# Patient Record
Sex: FEMALE | Race: WHITE | NOT HISPANIC OR LATINO
[De-identification: names, ages, dates, MRNs, and addresses within clinical notes are randomized per-mention and may not be internally consistent; named-entity substitution may affect disease eponyms.]

---

## 2018-08-07 DIAGNOSIS — Z92.89 PERSONAL HISTORY OF OTHER MEDICAL TREATMENT: ICD-10-CM

## 2018-08-10 PROBLEM — Z92.89 H/O MAMMOGRAM: Status: RESOLVED | Noted: 2018-08-10 | Resolved: 2018-08-10

## 2019-02-04 ENCOUNTER — APPOINTMENT (OUTPATIENT)
Dept: OTOLARYNGOLOGY | Facility: CLINIC | Age: 54
End: 2019-02-04
Payer: COMMERCIAL

## 2019-02-04 VITALS
WEIGHT: 158 LBS | DIASTOLIC BLOOD PRESSURE: 99 MMHG | TEMPERATURE: 99.2 F | HEART RATE: 96 BPM | BODY MASS INDEX: 26.33 KG/M2 | HEIGHT: 65 IN | SYSTOLIC BLOOD PRESSURE: 153 MMHG

## 2019-02-04 PROCEDURE — 99204 OFFICE O/P NEW MOD 45 MIN: CPT

## 2019-02-06 NOTE — HISTORY OF PRESENT ILLNESS
[de-identified] : 54yo F incidentally found to have asymptomatic pituitary adenoma in 2009 on work-up for a "throat issue." Patient followed with surveillance imaging by Dr. Vidal ([facility]) at [facility] since 2014. Patient here to discuss second opinion/surgical options. \par \par Patient imaging essentially stable since 2014. Patient denies vision changes, dizziness, nausea, vomiting, galactorrhea, change in ring or shoe size, excessive thirst and urination. Patient saw neuro-ophthalmologist in 2009 (Dr. Zeyad Shaw at [facility]) and was found to have normal afferent and efferent visual function at that time. Last endocrine work-up was in 2010, all wnl. Patient does endorse history of L-sided headaches but easily managed with Excedrin or Tylenol. Patient had originally seen provider at [facility] who recommended radiation therapy, however, radiation physician at [facility] informed her that it would not be the best option. Patient met with Dr. Pacheco to discuss options in 2013 and was recommended radiation tx at that time, however, patient chose to continue with Dr. Vidal. \par

## 2019-02-06 NOTE — PHYSICAL EXAM
[Midline] : trachea located in midline position [Normal] : orientation to person, place, and time: normal [de-identified] : 2+ radial pulses palpable bilaterally\par  [de-identified] : exposed skin of head and neck without rashes/lesions.\par \par

## 2019-02-06 NOTE — ASSESSMENT
[FreeTextEntry1] : 52yo F incidentally found to have asymptomatic pituitary adenoma in 2009. Patient followed with surveillance imaging by Dr. Vidal ([place]) at [facility] since 2014. Patient here to discuss second opinion/surgical options with Dr. West & Dr. Lucero. \par - recommend repeat and continued endocrine and neuro-ophthalmology evaluation (last performed in 2010) \par - was provided with pituitary center booklet by Dr. Lucero's office with provider contact information for endocrine & neuro-ophthalmology\par - imaging from 2014 to date was stable but patient due for repeat scan (was due in Dec 2018); patient would like to continue to get it done at [facility] where she has been doing it for the past 4-5 years\par - discussed at length about continued imaging surveillance and no immediate need to operate since patient remains asymptomatic; importantly, patient's lesion encircles L internal carotid, which will make her operation technically difficult if and/or when she requires surgery\par - Instructed patient to report any new or worsening symptoms\par - Patient expressed verbal understanding of and agreement to above plan\par

## 2019-03-21 ENCOUNTER — FORM ENCOUNTER (OUTPATIENT)
Age: 54
End: 2019-03-21

## 2019-03-22 ENCOUNTER — APPOINTMENT (OUTPATIENT)
Dept: MRI IMAGING | Facility: HOSPITAL | Age: 54
End: 2019-03-22
Payer: COMMERCIAL

## 2019-03-22 ENCOUNTER — OUTPATIENT (OUTPATIENT)
Dept: OUTPATIENT SERVICES | Facility: HOSPITAL | Age: 54
LOS: 1 days | End: 2019-03-22
Payer: COMMERCIAL

## 2019-03-22 PROCEDURE — 70553 MRI BRAIN STEM W/O & W/DYE: CPT | Mod: 26

## 2019-03-22 PROCEDURE — 70553 MRI BRAIN STEM W/O & W/DYE: CPT

## 2019-03-22 PROCEDURE — A9585: CPT

## 2019-04-01 ENCOUNTER — APPOINTMENT (OUTPATIENT)
Dept: OTOLARYNGOLOGY | Facility: CLINIC | Age: 54
End: 2019-04-01
Payer: COMMERCIAL

## 2019-04-01 ENCOUNTER — APPOINTMENT (OUTPATIENT)
Dept: NEUROSURGERY | Facility: CLINIC | Age: 54
End: 2019-04-01
Payer: COMMERCIAL

## 2019-04-01 VITALS
SYSTOLIC BLOOD PRESSURE: 148 MMHG | HEART RATE: 72 BPM | TEMPERATURE: 98.4 F | DIASTOLIC BLOOD PRESSURE: 92 MMHG | OXYGEN SATURATION: 98 %

## 2019-04-01 PROCEDURE — 99214 OFFICE O/P EST MOD 30 MIN: CPT

## 2019-04-01 PROCEDURE — 99213 OFFICE O/P EST LOW 20 MIN: CPT

## 2019-04-01 NOTE — ASSESSMENT
[FreeTextEntry1] : 52yo F incidentally found to have asymptomatic pituitary adenoma in 2009. Patient followed with surveillance imaging by Dr. Vidal ([place]) at [facility] since 2014. Patient here to discuss second opinion/surgical options with Dr. West & Dr. Lucero. \par \par 1. recommend repeat and continued endocrine and neuro-ophthalmology evaluation (last performed in 2010), pt will see next week \par 2. was provided with pituitary center booklet by Dr. Lucero's office with provider contact information for endocrine & neuro-ophthalmology\par 3. 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 discussed at length about continued imaging surveillance and no immediate need to operate since patient remains asymptomatic; importantly, patient's lesion encircles L internal carotid, which will make her operation technically difficult if and/or when she requires surgery\par 5 Instructed patient to report any new or worsening symptoms\par  f/u 1 year after MRI\par

## 2019-04-01 NOTE — PHYSICAL EXAM
[Midline] : trachea located in midline position [Normal] : orientation to person, place, and time: normal [de-identified] : 2+ radial pulses palpable bilaterally\par  [de-identified] : exposed skin of head and neck without rashes/lesions.\par \par

## 2019-04-01 NOTE — HISTORY OF PRESENT ILLNESS
[de-identified] : 52yo F incidentally found to have asymptomatic pituitary adenoma in 2009 on work-up for a "throat issue." Patient followed with surveillance imaging by Dr. Vidal ([facility] [place]) at [facility] since 2014. Patient here to discuss second opinion/surgical options. \par \par Patient imaging essentially stable since 2014. Patient denies vision changes, dizziness, nausea, vomiting, galactorrhea, change in ring or shoe size, excessive thirst and urination. Patient saw neuro-ophthalmologist in 2009 (Dr. Zeyad Shaw at [facility]) and was found to have normal afferent and efferent visual function at that time. Last endocrine work-up was in 2010, all wnl. Patient does endorse history of L-sided headaches but easily managed with Excedrin or Tylenol. Patient had originally seen provider at [facility] who recommended radiation therapy, however, radiation physician at [facility] informed her that it would not be the best option. Patient met with Dr. Pacheco to discuss options in 2013 and was recommended radiation tx at that time, however, patient chose to continue with Dr. Vidal. \par

## 2019-07-03 ENCOUNTER — APPOINTMENT (OUTPATIENT)
Dept: ENDOCRINOLOGY | Facility: CLINIC | Age: 54
End: 2019-07-03
Payer: COMMERCIAL

## 2019-07-03 VITALS
SYSTOLIC BLOOD PRESSURE: 154 MMHG | WEIGHT: 159 LBS | DIASTOLIC BLOOD PRESSURE: 96 MMHG | BODY MASS INDEX: 26.46 KG/M2 | HEART RATE: 89 BPM

## 2019-07-03 PROCEDURE — 99213 OFFICE O/P EST LOW 20 MIN: CPT

## 2019-07-03 NOTE — HISTORY OF PRESENT ILLNESS
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  has some headaches which are not new (and she had them before) but they are not severe and they come and go/ wax and wane.\par sleeps well, 6.5 to 7 hours per night\par cooks own food, eats Mediterranean diet\par no CUshings or acromegaly symptoms.  no galactorrhea.\par \par No meds

## 2019-07-03 NOTE — DATA REVIEWED
[FreeTextEntry1] : 3/10: LH 37.8, FSH 48.4, prolactin 7, TSH 1.78, free T4 1.1, T3 122, cortisol 18, GH 2.3\par 11/09: prolactin 11.6, LH 21, FSH 71, cortisol 21.6, IGF1 187\par \par MRI Brain, 3/19:\par L pituitary macroadenoma with invasion into and throughout L cavernous sinus.  not sig changed since 11/14.  measures 19 x 15 mm.  no suprasellar tumor or mass effect.  chiasm normal signal, slight diagonal course.

## 2019-07-03 NOTE — ASSESSMENT
[FreeTextEntry1] : Pituitary macroadenoma, non functioning.  It is unlikely for adenoma to start secreting (become functioning) at this point.  However, if tumor progresses, pituitary insufficiency is possible (so also need to check for pit hormone deficiencies).   I think it is a good sign that she is able to make high FSH (suggests pituitary is functioning normally). \par RTO 1 year

## 2019-07-03 NOTE — REASON FOR VISIT
[Initial Eval - Existing Diagnosis] : an initial evaluation of an existing diagnosis [FreeTextEntry1] : pituitary adenoma

## 2019-07-03 NOTE — PHYSICAL EXAM
[Healthy Appearance] : healthy appearance [Alert] : alert [Normal Voice/Communication] : normal voice communication [No Proptosis] : no proptosis [Visual Fields Grossly Intact] : visual fields grossly intact [Normal Hearing] : hearing was normal [No Lid Lag] : no lid lag [Thyroid Not Enlarged] : the thyroid was not enlarged [No Thyroid Nodules] : there were no palpable thyroid nodules [Clear to Auscultation] : lungs were clear to auscultation bilaterally [Regular Rhythm] : with a regular rhythm [Normal S1, S2] : normal S1 and S2 [Normal Affect] : the affect was normal [Normal Mood] : the mood was normal [Abdominal Striae] : no abdominal striae [Acanthosis Nigricans] : no acanthosis nigricans [de-identified] : no Cushings or acromegaly features

## 2019-07-08 LAB
ALBUMIN SERPL ELPH-MCNC: 4.8 G/DL
ALP BLD-CCNC: 74 U/L
ALT SERPL-CCNC: 17 U/L
ANION GAP SERPL CALC-SCNC: 14 MMOL/L
AST SERPL-CCNC: 19 U/L
BILIRUB SERPL-MCNC: 1.1 MG/DL
BUN SERPL-MCNC: 12 MG/DL
CALCIUM SERPL-MCNC: 9.4 MG/DL
CHLORIDE SERPL-SCNC: 101 MMOL/L
CO2 SERPL-SCNC: 25 MMOL/L
CORTIS SERPL-MCNC: 10.8 UG/DL
CREAT SERPL-MCNC: 0.53 MG/DL
FSH SERPL-MCNC: 101 IU/L
GLUCOSE SERPL-MCNC: 87 MG/DL
POTASSIUM SERPL-SCNC: 4 MMOL/L
PROLACTIN SERPL-MCNC: 11.9 NG/ML
PROT SERPL-MCNC: 7.4 G/DL
SODIUM SERPL-SCNC: 140 MMOL/L
T4 FREE SERPL-MCNC: 1.2 NG/DL
T4 SERPL-MCNC: 6.8 UG/DL
TSH SERPL-ACNC: 1.29 UIU/ML

## 2019-09-24 NOTE — PHYSICAL EXAM
[Oriented To Time, Place, And Person] : oriented to person, place, and time [FreeTextEntry6] : MAEx4, 5/5

## 2019-09-24 NOTE — PLAN
[FreeTextEntry1] : I have reviewed the patient's clinical history and imaging. The differential diagnosis is skull base lesion, possibly meningioma. The patient's lesion has not changed in size since 2014 and she is asymptomatic from it. She does not need surgical or radiation treatment at this time. This plan was made in association with Dr. West and the patient. The patient should continue to have surveillance imaging every 6-12 months with follow-up with Dr. West and I. She should also see Dr. Umanzor for endocrine work-up.

## 2019-09-24 NOTE — ASSESSMENT
[FreeTextEntry1] : 53 year old female, hx of known pituitary mass originally followed by Dr. Vidal @ [place].  Pituitary mass is stable.  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  Good case for gamma knife if it continues to grow.  Will reimage brain in 9-12 months.

## 2019-09-24 NOTE — HISTORY OF PRESENT ILLNESS
[de-identified] : 53 year old female with an incidentally found asymptomatic pituitary adenoma in 2009 on work-up for throat issues. Followed with surveillance imaging by Dr. Vidal at [facility]. Imaging essentially stable since 2014. Patient does report L-sided headaches managed with Excedrin. Patient had seen provider @ [facility] who recommended radiation therapy, but wad then told it may not be the best option. Patient saw Dr. Pacheco in 2013 to discuss options and was recommended radiation tx at that time, however, patient chose to continue with Dr. Vidal. Now returns for evaluation.\par

## 2019-11-12 ENCOUNTER — OTHER (OUTPATIENT)
Age: 54
End: 2019-11-12

## 2020-01-13 ENCOUNTER — APPOINTMENT (OUTPATIENT)
Dept: OTOLARYNGOLOGY | Facility: CLINIC | Age: 55
End: 2020-01-13

## 2020-01-15 ENCOUNTER — NON-APPOINTMENT (OUTPATIENT)
Age: 55
End: 2020-01-15

## 2020-01-15 ENCOUNTER — APPOINTMENT (OUTPATIENT)
Dept: INTERNAL MEDICINE | Facility: CLINIC | Age: 55
End: 2020-01-15
Payer: COMMERCIAL

## 2020-01-15 VITALS
HEART RATE: 114 BPM | WEIGHT: 158 LBS | OXYGEN SATURATION: 99 % | TEMPERATURE: 98.9 F | SYSTOLIC BLOOD PRESSURE: 130 MMHG | DIASTOLIC BLOOD PRESSURE: 80 MMHG | HEIGHT: 65 IN | BODY MASS INDEX: 26.33 KG/M2

## 2020-01-15 DIAGNOSIS — Z80.0 FAMILY HISTORY OF MALIGNANT NEOPLASM OF DIGESTIVE ORGANS: ICD-10-CM

## 2020-01-15 DIAGNOSIS — Z87.19 PERSONAL HISTORY OF OTHER DISEASES OF THE DIGESTIVE SYSTEM: ICD-10-CM

## 2020-01-15 PROCEDURE — 93000 ELECTROCARDIOGRAM COMPLETE: CPT

## 2020-01-15 PROCEDURE — 99204 OFFICE O/P NEW MOD 45 MIN: CPT | Mod: 25

## 2020-01-15 PROCEDURE — 36415 COLL VENOUS BLD VENIPUNCTURE: CPT

## 2020-01-15 RX ORDER — CEPHALEXIN 250 MG/1
250 CAPSULE ORAL
Qty: 60 | Refills: 0 | Status: COMPLETED | COMMUNITY
Start: 2019-09-14

## 2020-01-15 RX ORDER — SULFAMETHOXAZOLE AND TRIMETHOPRIM 800; 160 MG/1; MG/1
800-160 TABLET ORAL
Qty: 14 | Refills: 0 | Status: COMPLETED | COMMUNITY
Start: 2019-09-16

## 2020-01-15 RX ORDER — BUTOCONAZOLE NITRATE 100 MG/5G
2 CREAM VAGINAL
Qty: 5 | Refills: 0 | Status: COMPLETED | COMMUNITY
Start: 2019-09-20

## 2020-01-15 NOTE — HISTORY OF PRESENT ILLNESS
[FreeTextEntry8] : epigastric pain\par - 4 days ago c/o profuse watery diarrhea\par - following day sx improved however noted mid upper back pain a/w epigastric pain\par - concerned she was having MI\par - sx improved following morning, but now have returned\par - denies any fever or chills, no further episodes of diarrhea\par - h/o gastroenteritis dx on EGD by Dr. Scott 3 yrs ago\par - previously on PPI but stopped once sx improved.

## 2020-01-15 NOTE — HEALTH RISK ASSESSMENT
[] : No [No] : No [No falls in past year] : Patient reported no falls in the past year [0] : 2) Feeling down, depressed, or hopeless: Not at all (0) [[id number]] : 0

## 2020-01-16 ENCOUNTER — TRANSCRIPTION ENCOUNTER (OUTPATIENT)
Age: 55
End: 2020-01-16

## 2020-01-16 LAB
ALBUMIN SERPL ELPH-MCNC: 4.6 G/DL
ALP BLD-CCNC: 71 U/L
ALT SERPL-CCNC: 24 U/L
AMYLASE/CREAT SERPL: 41 U/L
ANION GAP SERPL CALC-SCNC: 14 MMOL/L
AST SERPL-CCNC: 20 U/L
BASOPHILS # BLD AUTO: 0.04 K/UL
BASOPHILS NFR BLD AUTO: 0.5 %
BILIRUB SERPL-MCNC: 1.2 MG/DL
BUN SERPL-MCNC: 10 MG/DL
CALCIUM SERPL-MCNC: 9.5 MG/DL
CHLORIDE SERPL-SCNC: 103 MMOL/L
CO2 SERPL-SCNC: 24 MMOL/L
CREAT SERPL-MCNC: 0.57 MG/DL
EOSINOPHIL # BLD AUTO: 0.02 K/UL
EOSINOPHIL NFR BLD AUTO: 0.2 %
GLUCOSE SERPL-MCNC: 104 MG/DL
HCT VFR BLD CALC: 42.8 %
HGB BLD-MCNC: 13.7 G/DL
IMM GRANULOCYTES NFR BLD AUTO: 0.4 %
LPL SERPL-CCNC: 25 U/L
LYMPHOCYTES # BLD AUTO: 1.3 K/UL
LYMPHOCYTES NFR BLD AUTO: 15.7 %
MAN DIFF?: NORMAL
MCHC RBC-ENTMCNC: 30.2 PG
MCHC RBC-ENTMCNC: 32 GM/DL
MCV RBC AUTO: 94.3 FL
MONOCYTES # BLD AUTO: 0.58 K/UL
MONOCYTES NFR BLD AUTO: 7 %
NEUTROPHILS # BLD AUTO: 6.29 K/UL
NEUTROPHILS NFR BLD AUTO: 76.2 %
PLATELET # BLD AUTO: 337 K/UL
POTASSIUM SERPL-SCNC: 3.9 MMOL/L
PROT SERPL-MCNC: 7.2 G/DL
RBC # BLD: 4.54 M/UL
RBC # FLD: 12.4 %
SODIUM SERPL-SCNC: 140 MMOL/L
WBC # FLD AUTO: 8.26 K/UL

## 2020-02-07 ENCOUNTER — APPOINTMENT (OUTPATIENT)
Dept: MRI IMAGING | Facility: HOSPITAL | Age: 55
End: 2020-02-07

## 2020-02-07 ENCOUNTER — APPOINTMENT (OUTPATIENT)
Dept: NEUROSURGERY | Facility: CLINIC | Age: 55
End: 2020-02-07
Payer: COMMERCIAL

## 2020-02-07 ENCOUNTER — OUTPATIENT (OUTPATIENT)
Dept: OUTPATIENT SERVICES | Facility: HOSPITAL | Age: 55
LOS: 1 days | End: 2020-02-07
Payer: COMMERCIAL

## 2020-02-07 VITALS
HEART RATE: 103 BPM | DIASTOLIC BLOOD PRESSURE: 96 MMHG | HEIGHT: 65 IN | BODY MASS INDEX: 26.33 KG/M2 | TEMPERATURE: 98.7 F | WEIGHT: 158 LBS | OXYGEN SATURATION: 98 % | RESPIRATION RATE: 18 BRPM | SYSTOLIC BLOOD PRESSURE: 146 MMHG

## 2020-02-07 PROCEDURE — 70553 MRI BRAIN STEM W/O & W/DYE: CPT | Mod: 26

## 2020-02-07 PROCEDURE — 99214 OFFICE O/P EST MOD 30 MIN: CPT

## 2020-02-07 PROCEDURE — A9585: CPT

## 2020-02-07 PROCEDURE — 70553 MRI BRAIN STEM W/O & W/DYE: CPT

## 2020-02-07 RX ORDER — ESOMEPRAZOLE MAGNESIUM 40 MG/1
40 CAPSULE, DELAYED RELEASE ORAL DAILY
Qty: 30 | Refills: 0 | Status: DISCONTINUED | COMMUNITY
Start: 2020-01-15 | End: 2020-02-07

## 2020-02-07 NOTE — HISTORY OF PRESENT ILLNESS
[> 3 months] : more  than 3 months [de-identified] : 53 year old female with an incidentally found asymptomatic pituitary adenoma in 2009 on work-up for throat issues. Followed with surveillance imaging by Dr. Vidal at [facility]. Imaging essentially stable since 2014. Patient does report L-sided headaches managed with Excedrin. Patient had seen provider @ [facility] who recommended radiation therapy, but wad then told it may not be the best option. Patient saw Dr. Pacheco in 2013 to discuss options and was recommended radiation tx at that time, however, patient chose to continue with Dr. Vidal.\par \par Returns today to review a new MRI. \par

## 2020-02-07 NOTE — PHYSICAL EXAM
[General Appearance - Alert] : alert [General Appearance - In No Acute Distress] : in no acute distress [Cranial Nerves Optic (II)] : visual acuity intact bilaterally,  pupils equal round and reactive to light [Cranial Nerves Oculomotor (III)] : extraocular motion intact [Cranial Nerves Trigeminal (V)] : facial sensation intact symmetrically [Cranial Nerves Facial (VII)] : face symmetrical [Cranial Nerves Vestibulocochlear (VIII)] : hearing was intact bilaterally [Cranial Nerves Glossopharyngeal (IX)] : tongue and palate midline [Cranial Nerves Hypoglossal (XII)] : there was no tongue deviation with protrusion [Motor Tone] : muscle tone was normal in all four extremities [Cranial Nerves Accessory (XI - Cranial And Spinal)] : head turning and shoulder shrug symmetric [Motor Handedness Right-Handed] : the patient is right hand dominant [PERRL With Normal Accommodation] : pupils were equal in size, round, reactive to light, with normal accommodation [Motor Strength] : muscle strength was normal in all four extremities [Full Visual Field] : full visual field [Extraocular Movements] : extraocular movements were intact [] : no respiratory distress [Neck Appearance] : the appearance of the neck was normal [Abnormal Walk] : normal gait

## 2020-02-07 NOTE — ASSESSMENT
[FreeTextEntry1] : My impression is that the patient suffers from a nonsecreting pituitary adenoma and a right petroclival meningioma. Her pituitary mass is stable and her meningioma has some very slight growth when compared to 2018. I had a long discussion with the patient regarding the role of continued surveillance with annual MRI.  The patient was extensively educated about the nature of her disease process. Therapeutic and diagnostic tests include MRI brain with and without contrast February 2021.  I will see the patient back at that time. I have explained the alternatives, risks and benefits to the patient and she understands and agrees to proceed. We discussed SRS. She would like to continue to monitor without treatment at this time. This risk of the procedure including but not limited to pain, infection, seizure, stroke, recurrence, residual disease, neurovascular injury, heart attack, pulmonary embolism, blindness, weakness, paralysis and death have been carefully explained to the patient who clearly understands and agrees to proceed.\par .

## 2020-02-12 ENCOUNTER — APPOINTMENT (OUTPATIENT)
Dept: TRANSPLANT | Facility: CLINIC | Age: 55
End: 2020-02-12
Payer: COMMERCIAL

## 2020-02-12 VITALS
WEIGHT: 158 LBS | RESPIRATION RATE: 15 BRPM | BODY MASS INDEX: 26.33 KG/M2 | DIASTOLIC BLOOD PRESSURE: 91 MMHG | HEIGHT: 65 IN | OXYGEN SATURATION: 98 % | SYSTOLIC BLOOD PRESSURE: 138 MMHG | HEART RATE: 100 BPM | TEMPERATURE: 97.9 F

## 2020-02-12 PROCEDURE — 99215 OFFICE O/P EST HI 40 MIN: CPT

## 2020-02-14 ENCOUNTER — APPOINTMENT (OUTPATIENT)
Dept: INTERNAL MEDICINE | Facility: CLINIC | Age: 55
End: 2020-02-14
Payer: COMMERCIAL

## 2020-02-14 PROCEDURE — 83014 H PYLORI DRUG ADMIN: CPT

## 2020-02-18 LAB — UREA BREATH TEST QL: POSITIVE

## 2020-03-04 RX ORDER — AMOXICILLIN 500 MG/1
500 TABLET, FILM COATED ORAL TWICE DAILY
Qty: 56 | Refills: 0 | Status: COMPLETED | COMMUNITY
Start: 2020-02-18 | End: 2020-03-03

## 2020-03-04 RX ORDER — OMEPRAZOLE 20 MG/1
20 CAPSULE, DELAYED RELEASE ORAL TWICE DAILY
Qty: 28 | Refills: 0 | Status: COMPLETED | COMMUNITY
Start: 2020-02-18 | End: 2020-03-03

## 2020-03-04 RX ORDER — CLARITHROMYCIN 500 MG/1
500 TABLET, FILM COATED ORAL
Qty: 28 | Refills: 0 | Status: COMPLETED | COMMUNITY
Start: 2020-02-18 | End: 2020-03-03

## 2020-06-01 ENCOUNTER — APPOINTMENT (OUTPATIENT)
Dept: MRI IMAGING | Facility: CLINIC | Age: 55
End: 2020-06-01

## 2021-06-08 ENCOUNTER — NON-APPOINTMENT (OUTPATIENT)
Age: 56
End: 2021-06-08

## 2021-07-08 ENCOUNTER — LABORATORY RESULT (OUTPATIENT)
Age: 56
End: 2021-07-08

## 2021-07-08 ENCOUNTER — APPOINTMENT (OUTPATIENT)
Dept: INTERNAL MEDICINE | Facility: CLINIC | Age: 56
End: 2021-07-08
Payer: COMMERCIAL

## 2021-07-08 VITALS
DIASTOLIC BLOOD PRESSURE: 100 MMHG | WEIGHT: 140 LBS | SYSTOLIC BLOOD PRESSURE: 146 MMHG | HEART RATE: 93 BPM | HEIGHT: 65 IN | OXYGEN SATURATION: 100 % | BODY MASS INDEX: 23.32 KG/M2 | TEMPERATURE: 98.3 F

## 2021-07-08 DIAGNOSIS — R10.13 EPIGASTRIC PAIN: ICD-10-CM

## 2021-07-08 DIAGNOSIS — Z87.19 PERSONAL HISTORY OF OTHER DISEASES OF THE DIGESTIVE SYSTEM: ICD-10-CM

## 2021-07-08 PROCEDURE — 99396 PREV VISIT EST AGE 40-64: CPT | Mod: 25

## 2021-07-08 PROCEDURE — 83014 H PYLORI DRUG ADMIN: CPT

## 2021-07-08 PROCEDURE — 36415 COLL VENOUS BLD VENIPUNCTURE: CPT

## 2021-07-08 PROCEDURE — 99072 ADDL SUPL MATRL&STAF TM PHE: CPT

## 2021-07-08 RX ORDER — SUCRALFATE 1 G/1
1 TABLET ORAL 4 TIMES DAILY
Qty: 360 | Refills: 0 | Status: COMPLETED | COMMUNITY
Start: 2020-02-13 | End: 2021-07-08

## 2021-07-08 NOTE — PHYSICAL EXAM
[Memory Grossly Normal] : memory grossly normal [Normal] : normal gait, coordination grossly intact, no focal deficits and deep tendon reflexes were 2+ and symmetric

## 2021-07-08 NOTE — HISTORY OF PRESENT ILLNESS
[FreeTextEntry1] : Annual visit  [de-identified] : Mrs. Awad is a 55 Y/F\par - overall doing well\par \par Breathtek Positive\par - completed treatment last year\par - has not had repeat testing\par \par HCM \par Colonoscopy - needed; due 2021\par GYN- Completed would like new referral. Dr. Mcginnis dis not accept insurance

## 2021-07-08 NOTE — HEALTH RISK ASSESSMENT
[0] : 2) Feeling down, depressed, or hopeless: Not at all (0) [Patient reported mammogram was normal] : Patient reported mammogram was normal [FreeTextEntry1] : Overall health [[id number]] : 0 [MammogramDate] : 06/15/2021 [PapSmearDate] : 01/01/2021 [ColonoscopyDate] : 01/01/2016 [AdvancecareDate] : 07/08/2021

## 2021-07-13 ENCOUNTER — NON-APPOINTMENT (OUTPATIENT)
Age: 56
End: 2021-07-13

## 2021-07-14 ENCOUNTER — NON-APPOINTMENT (OUTPATIENT)
Age: 56
End: 2021-07-14

## 2021-07-14 LAB
25(OH)D3 SERPL-MCNC: 21.5 NG/ML
ALBUMIN SERPL ELPH-MCNC: 4.8 G/DL
ALP BLD-CCNC: 80 U/L
ALT SERPL-CCNC: 17 U/L
ANION GAP SERPL CALC-SCNC: 14 MMOL/L
APPEARANCE: ABNORMAL
AST SERPL-CCNC: 16 U/L
BASOPHILS # BLD AUTO: 0.06 K/UL
BASOPHILS NFR BLD AUTO: 1 %
BILIRUB SERPL-MCNC: 1.3 MG/DL
BILIRUBIN URINE: NEGATIVE
BLOOD URINE: NEGATIVE
BUN SERPL-MCNC: 12 MG/DL
CALCIUM SERPL-MCNC: 9.7 MG/DL
CHLORIDE SERPL-SCNC: 102 MMOL/L
CHOLEST SERPL-MCNC: 299 MG/DL
CO2 SERPL-SCNC: 26 MMOL/L
COLOR: YELLOW
CREAT SERPL-MCNC: 0.59 MG/DL
EOSINOPHIL # BLD AUTO: 0.07 K/UL
EOSINOPHIL NFR BLD AUTO: 1.2 %
ESTIMATED AVERAGE GLUCOSE: 103 MG/DL
GLUCOSE QUALITATIVE U: NEGATIVE
GLUCOSE SERPL-MCNC: 106 MG/DL
HBA1C MFR BLD HPLC: 5.2 %
HCT VFR BLD CALC: 45.8 %
HDLC SERPL-MCNC: 81 MG/DL
HGB BLD-MCNC: 14.4 G/DL
IMM GRANULOCYTES NFR BLD AUTO: 0.3 %
KETONES URINE: NORMAL
LDLC SERPL CALC-MCNC: 204 MG/DL
LEUKOCYTE ESTERASE URINE: ABNORMAL
LYMPHOCYTES # BLD AUTO: 1.61 K/UL
LYMPHOCYTES NFR BLD AUTO: 27.6 %
MAN DIFF?: NORMAL
MCHC RBC-ENTMCNC: 30.4 PG
MCHC RBC-ENTMCNC: 31.4 GM/DL
MCV RBC AUTO: 96.8 FL
MONOCYTES # BLD AUTO: 0.36 K/UL
MONOCYTES NFR BLD AUTO: 6.2 %
NEUTROPHILS # BLD AUTO: 3.72 K/UL
NEUTROPHILS NFR BLD AUTO: 63.7 %
NITRITE URINE: POSITIVE
NONHDLC SERPL-MCNC: 218 MG/DL
PH URINE: 6.5
PLATELET # BLD AUTO: 330 K/UL
POTASSIUM SERPL-SCNC: 3.9 MMOL/L
PROT SERPL-MCNC: 7.5 G/DL
PROTEIN URINE: NEGATIVE
RBC # BLD: 4.73 M/UL
RBC # FLD: 12.7 %
SODIUM SERPL-SCNC: 143 MMOL/L
SPECIFIC GRAVITY URINE: 1.02
TRIGL SERPL-MCNC: 70 MG/DL
TSH SERPL-ACNC: 1.54 UIU/ML
UREA BREATH TEST QL: NEGATIVE
UROBILINOGEN URINE: NORMAL
WBC # FLD AUTO: 5.84 K/UL

## 2021-07-30 ENCOUNTER — APPOINTMENT (OUTPATIENT)
Dept: HEART AND VASCULAR | Facility: CLINIC | Age: 56
End: 2021-07-30
Payer: COMMERCIAL

## 2021-07-30 ENCOUNTER — NON-APPOINTMENT (OUTPATIENT)
Age: 56
End: 2021-07-30

## 2021-07-30 VITALS
DIASTOLIC BLOOD PRESSURE: 94 MMHG | WEIGHT: 141 LBS | BODY MASS INDEX: 23.49 KG/M2 | HEIGHT: 65 IN | HEART RATE: 105 BPM | SYSTOLIC BLOOD PRESSURE: 160 MMHG | TEMPERATURE: 97.6 F

## 2021-07-30 PROCEDURE — 93000 ELECTROCARDIOGRAM COMPLETE: CPT

## 2021-07-30 PROCEDURE — 99204 OFFICE O/P NEW MOD 45 MIN: CPT

## 2021-08-01 NOTE — PHYSICAL EXAM
[Well Developed] : well developed [Well Nourished] : well nourished [No Acute Distress] : no acute distress [Normal Conjunctiva] : normal conjunctiva [Normal Venous Pressure] : normal venous pressure [No Carotid Bruit] : no carotid bruit [Normal S1, S2] : normal S1, S2 [No Murmur] : no murmur [No Rub] : no rub [No Gallop] : no gallop [Clear Lung Fields] : clear lung fields [Good Air Entry] : good air entry [No Respiratory Distress] : no respiratory distress  [Soft] : abdomen soft [Non Tender] : non-tender [No Masses/organomegaly] : no masses/organomegaly [Normal Bowel Sounds] : normal bowel sounds [Normal Gait] : normal gait [No Edema] : no edema [No Cyanosis] : no cyanosis [No Clubbing] : no clubbing [No Varicosities] : no varicosities [No Rash] : no rash [No Skin Lesions] : no skin lesions [Moves all extremities] : moves all extremities [No Focal Deficits] : no focal deficits [Normal Speech] : normal speech [Alert and Oriented] : alert and oriented [Normal memory] : normal memory [de-identified] : No xanthomas, xanthalasma, corneal arucs

## 2021-08-01 NOTE — HISTORY OF PRESENT ILLNESS
[FreeTextEntry1] : 56F here for evaluation of hyperlipidemia.\par \par Has had HLD since at least 2006. Had discussed statin at that time, but wanted to avoided medications so never started. \par Her home SBPs are <125. Usually elevated in the office due to anxiety.\par Reports she is an anxious person and has a lot of stress with work. \par \par Denies chest pain on exertion or rest, dyspnea, orthopnea, PND, pre syncope, syncope. \par \par PMHx:\par Pituitary macroadenoma (non secretory)\par Meningioma \par HLD\par \par Fam Hx: No Premature CAD\par \par Social hx:\par never smoked, rarely drinks alcohol\par 1 hour of exercise/week\par Diet score 8\par Menopause 48. No pregnancy complications\par \par Data:\par Lipids: T chol 299, HDL 81, , N , Trig 70\par

## 2021-08-01 NOTE — DISCUSSION/SUMMARY
[FreeTextEntry1] : 56F with longstanding HLD not on therapy.\par \par #HLD: Lifestyle is fairly optimal but lipids are abnormal.\par -Extensive discussion regarding starting statin therapy to aim 50% reduction at least in LDL.\par Start rosuvastatin 20mg\par -Continue with lifestyle, increase activity to 150 min/week\par -Obtain lipo(a)\par -CT calcium score to further risk stratify\par \par #White coat HTN: Monitor. BPs 120s at home. We will evaluate at a subsequent visit once established on lipid therapy\par \par Follow up 2 months\par \par

## 2021-08-01 NOTE — END OF VISIT
[] : Fellow [FreeTextEntry3] : Patient seen and examined. Case discussed with preventive cardiology fellow. Agree with plan as detailed above.

## 2021-08-02 LAB — APO LP(A) SERPL-MCNC: 43.1 NMOL/L

## 2021-09-28 ENCOUNTER — TRANSCRIPTION ENCOUNTER (OUTPATIENT)
Age: 56
End: 2021-09-28

## 2021-10-01 ENCOUNTER — NON-APPOINTMENT (OUTPATIENT)
Age: 56
End: 2021-10-01

## 2021-11-11 ENCOUNTER — APPOINTMENT (OUTPATIENT)
Dept: CT IMAGING | Facility: HOSPITAL | Age: 56
End: 2021-11-11

## 2022-01-11 ENCOUNTER — TRANSCRIPTION ENCOUNTER (OUTPATIENT)
Age: 57
End: 2022-01-11

## 2022-01-11 LAB
CHOLEST SERPL-MCNC: 260 MG/DL
HDLC SERPL-MCNC: 79 MG/DL
LDLC SERPL CALC-MCNC: 162 MG/DL
NONHDLC SERPL-MCNC: 180 MG/DL
TRIGL SERPL-MCNC: 94 MG/DL

## 2022-10-14 ENCOUNTER — APPOINTMENT (OUTPATIENT)
Dept: INTERNAL MEDICINE | Facility: CLINIC | Age: 57
End: 2022-10-14

## 2022-10-14 VITALS
OXYGEN SATURATION: 99 % | TEMPERATURE: 98.6 F | WEIGHT: 144 LBS | RESPIRATION RATE: 16 BRPM | BODY MASS INDEX: 23.99 KG/M2 | DIASTOLIC BLOOD PRESSURE: 93 MMHG | SYSTOLIC BLOOD PRESSURE: 142 MMHG | HEART RATE: 78 BPM | HEIGHT: 65 IN

## 2022-10-14 DIAGNOSIS — M25.561 PAIN IN RIGHT KNEE: ICD-10-CM

## 2022-10-14 DIAGNOSIS — A04.8 OTHER SPECIFIED BACTERIAL INTESTINAL INFECTIONS: ICD-10-CM

## 2022-10-14 PROCEDURE — 99396 PREV VISIT EST AGE 40-64: CPT | Mod: 25

## 2022-10-14 PROCEDURE — 36415 COLL VENOUS BLD VENIPUNCTURE: CPT

## 2022-10-14 NOTE — PHYSICAL EXAM
[No Acute Distress] : no acute distress [Well Nourished] : well nourished [Well Developed] : well developed [Well-Appearing] : well-appearing [Normal Sclera/Conjunctiva] : normal sclera/conjunctiva [EOMI] : extraocular movements intact [Normal Outer Ear/Nose] : the outer ears and nose were normal in appearance [No Respiratory Distress] : no respiratory distress  [No Accessory Muscle Use] : no accessory muscle use [Clear to Auscultation] : lungs were clear to auscultation bilaterally [Normal Rate] : normal rate  [Regular Rhythm] : with a regular rhythm [Normal S1, S2] : normal S1 and S2 [No Murmur] : no murmur heard [No Edema] : there was no peripheral edema [No Extremity Clubbing/Cyanosis] : no extremity clubbing/cyanosis [Soft] : abdomen soft [Non Tender] : non-tender [Non-distended] : non-distended [No Masses] : no abdominal mass palpated [No HSM] : no HSM [No CVA Tenderness] : no CVA  tenderness [No Spinal Tenderness] : no spinal tenderness [Grossly Normal Strength/Tone] : grossly normal strength/tone [No Rash] : no rash [Coordination Grossly Intact] : coordination grossly intact [No Focal Deficits] : no focal deficits [Normal Gait] : normal gait [Normal Affect] : the affect was normal [Alert and Oriented x3] : oriented to person, place, and time [Normal Mood] : the mood was normal [Normal Insight/Judgement] : insight and judgment were intact [de-identified] : right medial joint line tenderness

## 2022-10-14 NOTE — HISTORY OF PRESENT ILLNESS
[FreeTextEntry1] : annual [de-identified] : right knee pain w/ swelling\par - present for few months\par - evaluated by ortho at [facility]\par - completed PT\par - MRI showed b/l meniscal tears\par - was recommended to have cortisone injection, but deferred\par \par HCM\par - mammo completed 8/11/22\par - c scope done 2016, was due 2019

## 2022-10-21 ENCOUNTER — TRANSCRIPTION ENCOUNTER (OUTPATIENT)
Age: 57
End: 2022-10-21

## 2022-10-21 LAB
ALBUMIN SERPL ELPH-MCNC: 4.6 G/DL
ALP BLD-CCNC: 72 U/L
ALT SERPL-CCNC: 27 U/L
ANION GAP SERPL CALC-SCNC: 14 MMOL/L
APPEARANCE: CLEAR
AST SERPL-CCNC: 20 U/L
BASOPHILS # BLD AUTO: 0.05 K/UL
BASOPHILS NFR BLD AUTO: 0.9 %
BILIRUB SERPL-MCNC: 1.8 MG/DL
BILIRUBIN URINE: NEGATIVE
BLOOD URINE: NEGATIVE
BUN SERPL-MCNC: 9 MG/DL
CALCIUM SERPL-MCNC: 9.6 MG/DL
CHLORIDE SERPL-SCNC: 102 MMOL/L
CHOLEST SERPL-MCNC: 160 MG/DL
CO2 SERPL-SCNC: 25 MMOL/L
COLOR: YELLOW
CREAT SERPL-MCNC: 0.48 MG/DL
EGFR: 109 ML/MIN/1.73M2
EOSINOPHIL # BLD AUTO: 0.04 K/UL
EOSINOPHIL NFR BLD AUTO: 0.7 %
ESTIMATED AVERAGE GLUCOSE: 111 MG/DL
GLUCOSE QUALITATIVE U: NEGATIVE
GLUCOSE SERPL-MCNC: 94 MG/DL
HBA1C MFR BLD HPLC: 5.5 %
HCT VFR BLD CALC: 44.6 %
HDLC SERPL-MCNC: 78 MG/DL
HGB BLD-MCNC: 14.4 G/DL
IMM GRANULOCYTES NFR BLD AUTO: 0.2 %
KETONES URINE: NORMAL
LDLC SERPL CALC-MCNC: 70 MG/DL
LEUKOCYTE ESTERASE URINE: NEGATIVE
LYMPHOCYTES # BLD AUTO: 1.35 K/UL
LYMPHOCYTES NFR BLD AUTO: 23.6 %
MAN DIFF?: NORMAL
MCHC RBC-ENTMCNC: 30.1 PG
MCHC RBC-ENTMCNC: 32.3 GM/DL
MCV RBC AUTO: 93.1 FL
MONOCYTES # BLD AUTO: 0.33 K/UL
MONOCYTES NFR BLD AUTO: 5.8 %
NEUTROPHILS # BLD AUTO: 3.95 K/UL
NEUTROPHILS NFR BLD AUTO: 68.8 %
NITRITE URINE: NEGATIVE
NONHDLC SERPL-MCNC: 82 MG/DL
PH URINE: 6
PLATELET # BLD AUTO: 309 K/UL
POTASSIUM SERPL-SCNC: 3.9 MMOL/L
PROT SERPL-MCNC: 7.4 G/DL
PROTEIN URINE: NORMAL
RBC # BLD: 4.79 M/UL
RBC # FLD: 12.6 %
SODIUM SERPL-SCNC: 141 MMOL/L
SPECIFIC GRAVITY URINE: 1.02
TRIGL SERPL-MCNC: 60 MG/DL
TSH SERPL-ACNC: 0.65 UIU/ML
UROBILINOGEN URINE: NORMAL
WBC # FLD AUTO: 5.73 K/UL

## 2022-11-15 ENCOUNTER — TRANSCRIPTION ENCOUNTER (OUTPATIENT)
Age: 57
End: 2022-11-15

## 2022-11-15 DIAGNOSIS — R17 UNSPECIFIED JAUNDICE: ICD-10-CM

## 2022-11-22 DIAGNOSIS — D35.2 BENIGN NEOPLASM OF PITUITARY GLAND: ICD-10-CM

## 2022-11-22 DIAGNOSIS — K76.89 OTHER SPECIFIED DISEASES OF LIVER: ICD-10-CM

## 2022-12-03 ENCOUNTER — APPOINTMENT (OUTPATIENT)
Dept: MRI IMAGING | Facility: CLINIC | Age: 57
End: 2022-12-03

## 2022-12-03 ENCOUNTER — OUTPATIENT (OUTPATIENT)
Dept: OUTPATIENT SERVICES | Facility: HOSPITAL | Age: 57
LOS: 1 days | End: 2022-12-03

## 2022-12-03 ENCOUNTER — RESULT REVIEW (OUTPATIENT)
Age: 57
End: 2022-12-03

## 2022-12-03 PROCEDURE — 74183 MRI ABD W/O CNTR FLWD CNTR: CPT | Mod: 26

## 2022-12-13 LAB
ALBUMIN SERPL ELPH-MCNC: 4.9 G/DL
ALP BLD-CCNC: 76 U/L
ALT SERPL-CCNC: 31 U/L
ANION GAP SERPL CALC-SCNC: 13 MMOL/L
AST SERPL-CCNC: 22 U/L
BASOPHILS # BLD AUTO: 0.08 K/UL
BASOPHILS NFR BLD AUTO: 1.4 %
BILIRUB SERPL-MCNC: 1.2 MG/DL
BUN SERPL-MCNC: 12 MG/DL
CALCIUM SERPL-MCNC: 10.4 MG/DL
CHLORIDE SERPL-SCNC: 103 MMOL/L
CO2 SERPL-SCNC: 28 MMOL/L
CREAT SERPL-MCNC: 0.56 MG/DL
EGFR: 105 ML/MIN/1.73M2
EOSINOPHIL # BLD AUTO: 0.07 K/UL
EOSINOPHIL NFR BLD AUTO: 1.2 %
GLUCOSE SERPL-MCNC: 77 MG/DL
HCT VFR BLD CALC: 42.1 %
HGB BLD-MCNC: 14.1 G/DL
IMM GRANULOCYTES NFR BLD AUTO: 0.2 %
INR PPP: 0.99 RATIO
LYMPHOCYTES # BLD AUTO: 2.09 K/UL
LYMPHOCYTES NFR BLD AUTO: 35.7 %
MAN DIFF?: NORMAL
MCHC RBC-ENTMCNC: 30.5 PG
MCHC RBC-ENTMCNC: 33.5 GM/DL
MCV RBC AUTO: 90.9 FL
MONOCYTES # BLD AUTO: 0.45 K/UL
MONOCYTES NFR BLD AUTO: 7.7 %
NEUTROPHILS # BLD AUTO: 3.16 K/UL
NEUTROPHILS NFR BLD AUTO: 53.8 %
PLATELET # BLD AUTO: 307 K/UL
POTASSIUM SERPL-SCNC: 3.8 MMOL/L
PROT SERPL-MCNC: 7.2 G/DL
PT BLD: 11.6 SEC
RBC # BLD: 4.63 M/UL
RBC # FLD: 12 %
SODIUM SERPL-SCNC: 143 MMOL/L
WBC # FLD AUTO: 5.86 K/UL

## 2023-06-05 ENCOUNTER — NON-APPOINTMENT (OUTPATIENT)
Age: 58
End: 2023-06-05

## 2023-06-16 ENCOUNTER — APPOINTMENT (OUTPATIENT)
Dept: OBGYN | Facility: CLINIC | Age: 58
End: 2023-06-16

## 2023-06-16 ENCOUNTER — APPOINTMENT (OUTPATIENT)
Dept: OBGYN | Facility: CLINIC | Age: 58
End: 2023-06-16
Payer: COMMERCIAL

## 2023-06-16 VITALS
BODY MASS INDEX: 25.16 KG/M2 | WEIGHT: 151 LBS | HEIGHT: 65 IN | HEART RATE: 78 BPM | OXYGEN SATURATION: 99 % | DIASTOLIC BLOOD PRESSURE: 110 MMHG | SYSTOLIC BLOOD PRESSURE: 169 MMHG

## 2023-06-16 DIAGNOSIS — Z01.419 ENCOUNTER FOR GYNECOLOGICAL EXAMINATION (GENERAL) (ROUTINE) W/OUT ABNORMAL FINDINGS: ICD-10-CM

## 2023-06-16 DIAGNOSIS — N95.1 MENOPAUSAL AND FEMALE CLIMACTERIC STATES: ICD-10-CM

## 2023-06-16 PROCEDURE — 99386 PREV VISIT NEW AGE 40-64: CPT

## 2023-06-16 RX ORDER — ESTRADIOL 4 UG/1
4 INSERT VAGINAL
Qty: 24 | Refills: 3 | Status: ACTIVE | COMMUNITY
Start: 2023-06-16 | End: 1900-01-01

## 2023-06-16 RX ORDER — ESTRADIOL 4 UG/1
4 INSERT VAGINAL
Qty: 1 | Refills: 0 | Status: ACTIVE | COMMUNITY
Start: 2023-06-16 | End: 1900-01-01

## 2023-06-16 NOTE — PLAN
[FreeTextEntry1] : annual; pap and hpv\par \par has mammogram next week with private radiologist so doesn't need referral\par \par has vaginal dryness/ irritationvulva, had been on imvexxy: will restart\par also may try lubricants and possibly vaseline\par \par will do another colonoscopy\par reminded to do skin checks\par \par has off and on uti sx so cx done, was recently given keflex from urgent care

## 2023-06-16 NOTE — HISTORY OF PRESENT ILLNESS
[LMP unknown] : LMP unknown [Y] : Patient is sexually active [N] : Patient denies prior pregnancies [Patient reported mammogram was normal] : Patient reported mammogram was normal [Patient reported colonoscopy was normal] : Patient reported colonoscopy was normal [FreeTextEntry1] : Patient presents for initial office visit.  [Mammogramdate] : 2022

## 2023-06-16 NOTE — REVIEW OF SYSTEMS
[Negative] : Cardiovascular [FreeTextEntry2] : high cholesterol [de-identified] : herminia [de-identified] : nonsecreting pituitary adenoma

## 2023-06-17 LAB — HPV HIGH+LOW RISK DNA PNL CVX: NOT DETECTED

## 2023-06-21 LAB
BACTERIA UR CULT: ABNORMAL
CYTOLOGY CVX/VAG DOC THIN PREP: NORMAL

## 2023-12-12 ENCOUNTER — TRANSCRIPTION ENCOUNTER (OUTPATIENT)
Age: 58
End: 2023-12-12

## 2023-12-21 RX ORDER — ROSUVASTATIN CALCIUM 20 MG/1
20 TABLET, FILM COATED ORAL DAILY
Qty: 90 | Refills: 0 | Status: ACTIVE | COMMUNITY
Start: 2021-07-30 | End: 1900-01-01

## 2024-02-14 ENCOUNTER — TRANSCRIPTION ENCOUNTER (OUTPATIENT)
Age: 59
End: 2024-02-14

## 2024-02-15 ENCOUNTER — TRANSCRIPTION ENCOUNTER (OUTPATIENT)
Age: 59
End: 2024-02-15

## 2024-02-16 ENCOUNTER — TRANSCRIPTION ENCOUNTER (OUTPATIENT)
Age: 59
End: 2024-02-16

## 2024-02-26 ENCOUNTER — APPOINTMENT (OUTPATIENT)
Dept: INTERNAL MEDICINE | Facility: CLINIC | Age: 59
End: 2024-02-26
Payer: COMMERCIAL

## 2024-02-26 VITALS
TEMPERATURE: 98.7 F | BODY MASS INDEX: 24.99 KG/M2 | SYSTOLIC BLOOD PRESSURE: 153 MMHG | HEART RATE: 89 BPM | OXYGEN SATURATION: 99 % | WEIGHT: 150 LBS | DIASTOLIC BLOOD PRESSURE: 92 MMHG | RESPIRATION RATE: 16 BRPM | HEIGHT: 65 IN

## 2024-02-26 VITALS — DIASTOLIC BLOOD PRESSURE: 104 MMHG | SYSTOLIC BLOOD PRESSURE: 152 MMHG

## 2024-02-26 VITALS — SYSTOLIC BLOOD PRESSURE: 137 MMHG | DIASTOLIC BLOOD PRESSURE: 72 MMHG

## 2024-02-26 DIAGNOSIS — K21.9 GASTRO-ESOPHAGEAL REFLUX DISEASE W/OUT ESOPHAGITIS: ICD-10-CM

## 2024-02-26 PROCEDURE — G2211 COMPLEX E/M VISIT ADD ON: CPT

## 2024-02-26 PROCEDURE — 99214 OFFICE O/P EST MOD 30 MIN: CPT | Mod: GC

## 2024-02-26 RX ORDER — NITROFURANTOIN MACROCRYSTALS 100 MG/1
100 CAPSULE ORAL
Qty: 10 | Refills: 3 | Status: DISCONTINUED | COMMUNITY
Start: 2023-06-21 | End: 2024-02-26

## 2024-02-26 NOTE — PHYSICAL EXAM
[No Acute Distress] : no acute distress [Well-Appearing] : well-appearing [Normal Voice/Communication] : normal voice/communication [Normal Sclera/Conjunctiva] : normal sclera/conjunctiva [Normal Outer Ear/Nose] : the outer ears and nose were normal in appearance [Normal Oropharynx] : the oropharynx was normal [No Lymphadenopathy] : no lymphadenopathy [Supple] : supple [Thyroid Normal, No Nodules] : the thyroid was normal and there were no nodules present [No Respiratory Distress] : no respiratory distress  [No Accessory Muscle Use] : no accessory muscle use [Clear to Auscultation] : lungs were clear to auscultation bilaterally [Normal Rate] : normal rate  [Regular Rhythm] : with a regular rhythm [Normal S1, S2] : normal S1 and S2 [No Murmur] : no murmur heard [Soft] : abdomen soft [No Extremity Clubbing/Cyanosis] : no extremity clubbing/cyanosis [Non-distended] : non-distended [Non Tender] : non-tender [Normal Bowel Sounds] : normal bowel sounds [Normal Posterior Cervical Nodes] : no posterior cervical lymphadenopathy [Normal Anterior Cervical Nodes] : no anterior cervical lymphadenopathy [No Focal Deficits] : no focal deficits [Coordination Grossly Intact] : coordination grossly intact [Normal Affect] : the affect was normal [Normal Insight/Judgement] : insight and judgment were intact

## 2024-02-26 NOTE — HISTORY OF PRESENT ILLNESS
[de-identified] : 57yo female with PMHx of HLD and H. pylori (s/p txt in 2020) who c/o reflux in the throat for ~2-3 weeks. Describes it as burning sensation in the back of throat and it is worse at night. Symptoms suddenly developed without known trigger. Denies fevers, chills, URI symptoms, abdominal pain, N/V/D, dysuria. Tried Nexium 20mg qd for 7 days with some improvement. Also modified diet by avoiding citrus, tomato, drinking decaf tea, and only one cup of coffee in AM, instead of multiple. Admits to having increased stress these past few months after mother-in-law passed away and father-in-law becoming sick in [place]. Is traveling to [place] frequently now.  [FreeTextEntry1] : acid reflux

## 2024-02-29 ENCOUNTER — TRANSCRIPTION ENCOUNTER (OUTPATIENT)
Age: 59
End: 2024-02-29

## 2024-02-29 LAB
ALBUMIN SERPL ELPH-MCNC: 4.8 G/DL
ALP BLD-CCNC: 83 U/L
ALT SERPL-CCNC: 21 U/L
ANION GAP SERPL CALC-SCNC: 12 MMOL/L
APPEARANCE: CLEAR
AST SERPL-CCNC: 18 U/L
BACTERIA: NEGATIVE /HPF
BASOPHILS # BLD AUTO: 0.06 K/UL
BASOPHILS NFR BLD AUTO: 1 %
BILIRUB SERPL-MCNC: 1.3 MG/DL
BILIRUBIN URINE: NEGATIVE
BLOOD URINE: NEGATIVE
BUN SERPL-MCNC: 12 MG/DL
CALCIUM SERPL-MCNC: 9.4 MG/DL
CAST: 0 /LPF
CHLORIDE SERPL-SCNC: 102 MMOL/L
CHOLEST SERPL-MCNC: 195 MG/DL
CO2 SERPL-SCNC: 27 MMOL/L
COLOR: YELLOW
CREAT SERPL-MCNC: 0.61 MG/DL
EGFR: 102 ML/MIN/1.73M2
EOSINOPHIL # BLD AUTO: 0.11 K/UL
EOSINOPHIL NFR BLD AUTO: 1.9 %
EPITHELIAL CELLS: 1 /HPF
ESTIMATED AVERAGE GLUCOSE: 108 MG/DL
GLUCOSE QUALITATIVE U: NEGATIVE MG/DL
GLUCOSE SERPL-MCNC: 104 MG/DL
HBA1C MFR BLD HPLC: 5.4 %
HCT VFR BLD CALC: 45.2 %
HDLC SERPL-MCNC: 74 MG/DL
HGB BLD-MCNC: 14.4 G/DL
IMM GRANULOCYTES NFR BLD AUTO: 0.3 %
KETONES URINE: NEGATIVE MG/DL
LDLC SERPL CALC-MCNC: 109 MG/DL
LEUKOCYTE ESTERASE URINE: NEGATIVE
LYMPHOCYTES # BLD AUTO: 1.72 K/UL
LYMPHOCYTES NFR BLD AUTO: 29.1 %
MAN DIFF?: NORMAL
MCHC RBC-ENTMCNC: 30 PG
MCHC RBC-ENTMCNC: 31.9 GM/DL
MCV RBC AUTO: 94.2 FL
MICROSCOPIC-UA: NORMAL
MONOCYTES # BLD AUTO: 0.41 K/UL
MONOCYTES NFR BLD AUTO: 6.9 %
NEUTROPHILS # BLD AUTO: 3.6 K/UL
NEUTROPHILS NFR BLD AUTO: 60.8 %
NITRITE URINE: NEGATIVE
NONHDLC SERPL-MCNC: 121 MG/DL
PH URINE: 7
PLATELET # BLD AUTO: 310 K/UL
POTASSIUM SERPL-SCNC: 4.1 MMOL/L
PROT SERPL-MCNC: 7.4 G/DL
PROTEIN URINE: NEGATIVE MG/DL
RBC # BLD: 4.8 M/UL
RBC # FLD: 12.6 %
RED BLOOD CELLS URINE: 1 /HPF
SODIUM SERPL-SCNC: 141 MMOL/L
SPECIFIC GRAVITY URINE: 1.02
TRIGL SERPL-MCNC: 67 MG/DL
TSH SERPL-ACNC: 1.09 UIU/ML
UREA BREATH TEST QL: NEGATIVE
UROBILINOGEN URINE: 1 MG/DL
WBC # FLD AUTO: 5.92 K/UL
WHITE BLOOD CELLS URINE: 0 /HPF

## 2024-03-05 ENCOUNTER — TRANSCRIPTION ENCOUNTER (OUTPATIENT)
Age: 59
End: 2024-03-05

## 2024-03-13 ENCOUNTER — RESULT CHARGE (OUTPATIENT)
Age: 59
End: 2024-03-13

## 2024-03-14 ENCOUNTER — NON-APPOINTMENT (OUTPATIENT)
Age: 59
End: 2024-03-14

## 2024-03-14 ENCOUNTER — APPOINTMENT (OUTPATIENT)
Dept: INTERNAL MEDICINE | Facility: CLINIC | Age: 59
End: 2024-03-14
Payer: COMMERCIAL

## 2024-03-14 VITALS — SYSTOLIC BLOOD PRESSURE: 122 MMHG | DIASTOLIC BLOOD PRESSURE: 81 MMHG

## 2024-03-14 VITALS
WEIGHT: 152.5 LBS | OXYGEN SATURATION: 99 % | DIASTOLIC BLOOD PRESSURE: 90 MMHG | TEMPERATURE: 98.1 F | BODY MASS INDEX: 25.41 KG/M2 | HEIGHT: 65 IN | SYSTOLIC BLOOD PRESSURE: 154 MMHG | HEART RATE: 85 BPM

## 2024-03-14 DIAGNOSIS — K21.9 GASTRO-ESOPHAGEAL REFLUX DISEASE W/OUT ESOPHAGITIS: ICD-10-CM

## 2024-03-14 DIAGNOSIS — R39.9 UNSPECIFIED SYMPTOMS AND SIGNS INVOLVING THE GENITOURINARY SYSTEM: ICD-10-CM

## 2024-03-14 DIAGNOSIS — Z00.00 ENCOUNTER FOR GENERAL ADULT MEDICAL EXAMINATION W/OUT ABNORMAL FINDINGS: ICD-10-CM

## 2024-03-14 DIAGNOSIS — R03.0 ELEVATED BLOOD-PRESSURE READING, W/OUT DIAGNOSIS OF HYPERTENSION: ICD-10-CM

## 2024-03-14 DIAGNOSIS — Z12.11 ENCOUNTER FOR SCREENING FOR MALIGNANT NEOPLASM OF COLON: ICD-10-CM

## 2024-03-14 DIAGNOSIS — E78.5 HYPERLIPIDEMIA, UNSPECIFIED: ICD-10-CM

## 2024-03-14 DIAGNOSIS — Z23 ENCOUNTER FOR IMMUNIZATION: ICD-10-CM

## 2024-03-14 PROCEDURE — 93000 ELECTROCARDIOGRAM COMPLETE: CPT

## 2024-03-14 PROCEDURE — 99396 PREV VISIT EST AGE 40-64: CPT

## 2024-03-14 RX ORDER — ESOMEPRAZOLE MAGNESIUM 40 MG/1
40 CAPSULE, DELAYED RELEASE ORAL
Qty: 60 | Refills: 0 | Status: COMPLETED | COMMUNITY
Start: 2024-02-26 | End: 2024-03-14

## 2024-03-14 NOTE — PHYSICAL EXAM
[No Acute Distress] : no acute distress [Well-Appearing] : well-appearing [No Edema] : there was no peripheral edema [Normal Sclera/Conjunctiva] : normal sclera/conjunctiva [Normal] : affect was normal and insight and judgment were intact [de-identified] : slight erythema at oropharynx w/o exudates, tonsillar hypertrophy

## 2024-03-14 NOTE — HISTORY OF PRESENT ILLNESS
[FreeTextEntry1] : annual physical [de-identified] : Pt is a 57 y/o F with PMHx of HLD and H. pylori (s/p txt in 2020) who presents to the office today for an annual physical.   Acid reflux: - Pt endorses having constant sore and burning sensation at the base of her throat since the beginning of Feb 2024  - She feels that she clears her throat more due to this sensation; she endorses having post-nasal drip at night.  - this sensation worsens with eating.  - She has been taking Nexium 40 mg which does not help -She has also been avoiding excess caffeine (1 cup a day) and citrus.  - Hx of gastritis in 2016  - stress from father-in-law being sick and traveling to/from [place], work.  - denies N/V, cough, abd pain  Elevated BP reading:  - Pt brought in at home BP: ranged from 127-132 systolic, diastolic mid 80s  - Pt cooks at home, does not use salt. Not very active (works from home)  HCM - Covid vaccine: UTD - Influenza vaccine: declines - Shingrix vaccine: will consider - Colonoscopy: not UTD, mother passed from colon CA. Last c-scope 2016 - Mammo: UTD.  - Pap: UTD.  - Ophthalmology: UTD - Dentist: UTD - Derm: not UTD, referral provided.  - Diet: Pt cooks at home, she avoids salt.  - Exercise: Active on the weekends but not during the week due to work from home. Likes to walk but has not done it a lot.

## 2024-03-14 NOTE — HEALTH RISK ASSESSMENT
[Never] : Never [0-4] : 0-4 [Patient reported colonoscopy was normal] : Patient reported colonoscopy was normal [] :  [Fully functional (bathing, dressing, toileting, transferring, walking, feeding)] : Fully functional (bathing, dressing, toileting, transferring, walking, feeding) [Fully functional (using the telephone, shopping, preparing meals, housekeeping, doing laundry, using] : Fully functional and needs no help or supervision to perform IADLs (using the telephone, shopping, preparing meals, housekeeping, doing laundry, using transportation, managing medications and managing finances) [Patient reported PAP Smear was normal] : Patient reported PAP Smear was normal [Patient reported mammogram was normal] : Patient reported mammogram was normal [Reports changes in hearing] : Reports no changes in hearing [Reports changes in vision] : Reports no changes in vision [MammogramDate] : 2023 [PapSmearDate] : 2023 [ColonoscopyDate] : 2016 [ColonoscopyComments] : NOT UTD

## 2024-03-14 NOTE — END OF VISIT
[FreeTextEntry3] :  I personally performed the service described in the documentation, reviewed the documentation recorded by the Melissa Early in my presence and it accurately and completely records my words and actions.  I, Dr. Gunter, personally performed the evaluation and management (E/M) services for this established patient who presents today with (a) new problem(s)/exacerbation of (an) existing condition(s).  That E/M includes conducting the examination, assessing all new/exacerbated conditions, and establishing a new plan of care.  Today, my PA, Rachelle Pfeiffer, was here to observe my evaluation and management services for this new problem/exacerbated condition to be followed going forward.

## 2025-05-02 LAB
APPEARANCE: CLEAR
BACTERIA: NEGATIVE /HPF
BILIRUBIN URINE: NEGATIVE
BLOOD URINE: NEGATIVE
CAST: 1 /LPF
COLOR: NORMAL
EPITHELIAL CELLS: 1 /HPF
GLUCOSE QUALITATIVE U: NEGATIVE MG/DL
KETONES URINE: NEGATIVE MG/DL
LEUKOCYTE ESTERASE URINE: NEGATIVE
MICROSCOPIC-UA: NORMAL
NITRITE URINE: NEGATIVE
PH URINE: 6.5
PROTEIN URINE: NORMAL MG/DL
RED BLOOD CELLS URINE: 2 /HPF
SPECIFIC GRAVITY URINE: 1.02
UROBILINOGEN URINE: 0.2 MG/DL
WHITE BLOOD CELLS URINE: 0 /HPF

## 2025-05-09 LAB
ALBUMIN SERPL ELPH-MCNC: 4.4 G/DL
ALP BLD-CCNC: 74 U/L
ALT SERPL-CCNC: 21 U/L
ANION GAP SERPL CALC-SCNC: 16 MMOL/L
AST SERPL-CCNC: 20 U/L
BASOPHILS # BLD AUTO: 0.08 K/UL
BASOPHILS NFR BLD AUTO: 1.2 %
BILIRUB SERPL-MCNC: 1.2 MG/DL
BUN SERPL-MCNC: 14 MG/DL
CALCIUM SERPL-MCNC: 9.4 MG/DL
CHLORIDE SERPL-SCNC: 102 MMOL/L
CHOLEST SERPL-MCNC: 175 MG/DL
CO2 SERPL-SCNC: 24 MMOL/L
CREAT SERPL-MCNC: 0.55 MG/DL
EGFRCR SERPLBLD CKD-EPI 2021: 104 ML/MIN/1.73M2
EOSINOPHIL # BLD AUTO: 0.15 K/UL
EOSINOPHIL NFR BLD AUTO: 2.3 %
ESTIMATED AVERAGE GLUCOSE: 114 MG/DL
GLUCOSE SERPL-MCNC: 74 MG/DL
HBA1C MFR BLD HPLC: 5.6 %
HCT VFR BLD CALC: 42.8 %
HCV AB SER QL: NONREACTIVE
HCV S/CO RATIO: 0.16 S/CO
HDLC SERPL-MCNC: 79 MG/DL
HGB BLD-MCNC: 14 G/DL
IMM GRANULOCYTES NFR BLD AUTO: 0.3 %
LDLC SERPL-MCNC: 85 MG/DL
LYMPHOCYTES # BLD AUTO: 2.33 K/UL
LYMPHOCYTES NFR BLD AUTO: 35.9 %
MAN DIFF?: NORMAL
MCHC RBC-ENTMCNC: 30.9 PG
MCHC RBC-ENTMCNC: 32.7 G/DL
MCV RBC AUTO: 94.5 FL
MONOCYTES # BLD AUTO: 0.44 K/UL
MONOCYTES NFR BLD AUTO: 6.8 %
NEUTROPHILS # BLD AUTO: 3.47 K/UL
NEUTROPHILS NFR BLD AUTO: 53.5 %
NONHDLC SERPL-MCNC: 96 MG/DL
PLATELET # BLD AUTO: 299 K/UL
POTASSIUM SERPL-SCNC: 4.5 MMOL/L
PROT SERPL-MCNC: 7 G/DL
RBC # BLD: 4.53 M/UL
RBC # FLD: 13 %
SODIUM SERPL-SCNC: 141 MMOL/L
TRIGL SERPL-MCNC: 58 MG/DL
TSH SERPL-ACNC: 1.72 UIU/ML
WBC # FLD AUTO: 6.49 K/UL

## 2025-05-19 ENCOUNTER — NON-APPOINTMENT (OUTPATIENT)
Age: 60
End: 2025-05-19

## 2025-05-19 ENCOUNTER — APPOINTMENT (OUTPATIENT)
Dept: INTERNAL MEDICINE | Facility: CLINIC | Age: 60
End: 2025-05-19
Payer: COMMERCIAL

## 2025-05-19 VITALS
SYSTOLIC BLOOD PRESSURE: 174 MMHG | HEIGHT: 65 IN | TEMPERATURE: 98.2 F | HEART RATE: 97 BPM | BODY MASS INDEX: 25.83 KG/M2 | OXYGEN SATURATION: 99 % | WEIGHT: 155 LBS | DIASTOLIC BLOOD PRESSURE: 94 MMHG

## 2025-05-19 VITALS — SYSTOLIC BLOOD PRESSURE: 143 MMHG | DIASTOLIC BLOOD PRESSURE: 91 MMHG

## 2025-05-19 VITALS — SYSTOLIC BLOOD PRESSURE: 137 MMHG | DIASTOLIC BLOOD PRESSURE: 90 MMHG

## 2025-05-19 VITALS — SYSTOLIC BLOOD PRESSURE: 142 MMHG | DIASTOLIC BLOOD PRESSURE: 91 MMHG

## 2025-05-19 VITALS — SYSTOLIC BLOOD PRESSURE: 127 MMHG | DIASTOLIC BLOOD PRESSURE: 85 MMHG

## 2025-05-19 VITALS — DIASTOLIC BLOOD PRESSURE: 95 MMHG | SYSTOLIC BLOOD PRESSURE: 149 MMHG

## 2025-05-19 DIAGNOSIS — M25.561 PAIN IN RIGHT KNEE: ICD-10-CM

## 2025-05-19 DIAGNOSIS — D35.2 BENIGN NEOPLASM OF PITUITARY GLAND: ICD-10-CM

## 2025-05-19 DIAGNOSIS — Z00.00 ENCOUNTER FOR GENERAL ADULT MEDICAL EXAMINATION W/OUT ABNORMAL FINDINGS: ICD-10-CM

## 2025-05-19 DIAGNOSIS — R03.0 ELEVATED BLOOD-PRESSURE READING, W/OUT DIAGNOSIS OF HYPERTENSION: ICD-10-CM

## 2025-05-19 DIAGNOSIS — E78.5 HYPERLIPIDEMIA, UNSPECIFIED: ICD-10-CM

## 2025-05-19 DIAGNOSIS — H61.21 IMPACTED CERUMEN, RIGHT EAR: ICD-10-CM

## 2025-05-19 PROCEDURE — 99396 PREV VISIT EST AGE 40-64: CPT

## 2025-05-19 PROCEDURE — G0537: CPT

## 2025-05-19 PROCEDURE — 93000 ELECTROCARDIOGRAM COMPLETE: CPT
